# Patient Record
Sex: MALE | Race: WHITE
[De-identification: names, ages, dates, MRNs, and addresses within clinical notes are randomized per-mention and may not be internally consistent; named-entity substitution may affect disease eponyms.]

---

## 2018-05-22 ENCOUNTER — HOSPITAL ENCOUNTER (OUTPATIENT)
Dept: HOSPITAL 68 - STS | Age: 78
End: 2018-05-22
Attending: PODIATRIST
Payer: COMMERCIAL

## 2018-05-22 VITALS — WEIGHT: 196 LBS | HEIGHT: 70 IN | BODY MASS INDEX: 28.06 KG/M2

## 2018-05-22 DIAGNOSIS — Z85.038: ICD-10-CM

## 2018-05-22 DIAGNOSIS — Z86.718: ICD-10-CM

## 2018-05-22 DIAGNOSIS — L60.0: Primary | ICD-10-CM

## 2018-05-22 DIAGNOSIS — K21.9: ICD-10-CM

## 2018-05-22 NOTE — OPERATIVE REPORT
Operative/Inv Procedure Report
Surgery Date: 05/22/18
Name of Procedure:
1 local random advancement flap closure of open surgical wound right foot
2 local random advancement flap closure of open surgical wound left foot
3 partial matrixectomy right great toe
4 partial matrixectomy left great toe
5 intraoperative administration of ankle block anesthesia
Pre-Operative Diagnosis:
1 chronic, onychocryptosis right great toe
2 chronic, onychocryptosis left great toe
Post-Operative Diagnosis:
The same the same
Estimated Blood Loss: scant
Surgeon/Assistant:
Guadalupe DOLAN,Amol Melo DPM
Anesthesia: moderate sedation, block
 
Operative/Procedure Note
Note:
After obtaining informed consent the patient was brought to the operating room 
and placed on the operating table in the supine position.  The patient was then 
securely fastened to the operating table utilizing safety belt.  After 
administration of IV sedation, 10 mL of 0.5% Marcaine plain was infiltrated 
about the patient's left and right ankles.  2 g of Ancef were delivered 
intravenously times one dose.  A partial nail avulsion was performed at the 
medial aspect of the left and right great toes.  The left and right feet were 
then scrubbed, prepped and draped in usual aseptic manner.  A Penrose drain was 
placed about the right first metatarsophalangeal joint to achieve hemostasis.  A
Russell type procedure was utilized to resect the matrix cells medially.  The 
tissue was then sent for pathologic inspection.  Any remaining cells were 
curetted free.  A flap was then developed with release of the morning ligaments,
undermining mobilization and advancement of the adjacent tissues superiorly.  
The deep side of the flap was held with Vicryl and the skin edges reapproximated
4-0 nylon.  Incision was then dressed with Xeroform, 4 x 4's, Sofia and Coban.  
Attention was then directed to the left great toe where again a Penrose drain 
was placed about the first metatarsophalangeal joint to achieve hemostasis.  A 
Russell type procedure was then utilized to resect the matrix cells medially.  Any
remaining matrix cells were curetted free.  A flap was then developed with 
release of the morning ligaments, undermining and mobilization of the adjacent 
tissues superiorly.  The deep side of the flap was held with Vicryl and the skin
edges reapproximated 4-0 nylon.  Incision was dressed with Xeroform, 4 x 4's, 
Sofia and Coban.  The patient was noted to tolerate both procedure and 
anesthesia well and the patient was transported from the operating room to 
recovery vital signs stable best assess intact to both the left and right flaps.

## 2021-02-08 NOTE — HISTORY & PHYSICAL PRE-OP
General Information and HPI
History of Present Illness:
Adrianne is a 77-year-old male with a long-standing and worsening complaint of 
chronic ingrown nails to the medial aspect of his left and right great toes.  
The patient has undergone multiple, and office temporary partial nail avulsions,
some requiring courses of antibiotics.  Patient presents today for preoperative 
surgical consultation.
 
Allergies/Medications
Allergies:
Coded Allergies:
bacitracin (RASH 05/18/18)
 
Home Med list
Acetaminophen 500 MG TABLET   1 TAB PO PRN PAIN  (Reported)
Cholecalciferol (Vitamin D3) (Vitamin D) 1,000 UNIT TABLET   1 TAB PO DAILY 
SUPPLEMENT  (Reported)
Loratadine (Claritin) 10 MG TABLET   1 TAB PO PRN ALLERGIES  (Reported)
Multivit-Min/FA/Lycopen/Lutein (Centrum Silver Tablet) 1 EACH TABLET   1 TAB PO 
DAILY SUPPLEMENT  (Reported)
Omeprazole Magnesium (Prilosec Otc) 20 MG TABLET.DR   1 TAB PO DAILY GI  (
Reported)
Propylene Glycol (Systane Balance) 0.6 % DROPS   1 DROP OU TID BOTH EYES  (
Reported)
 
 
Past History
 
Medical History
Cardiovascular: DVT
Gastrointestinal: GERD
Cancer(s): COLON
 
Surgical History
Pertinent Surgical History: non-contributory
 
Review of Systems
Review of Systems:
Unremarkable except for that noted patient was no loss
 
Exam & Diagnostic Data
Physical Exam:
Lungs clear bilaterally.  Heart sounds rate and rhythm regular.  Lower extremity
physical exam demonstrates intact pedal pulses bilaterally.  Both dorsalis pedis
and posterior tibial arteries are palpable bilaterally.  Patient without any 
sensory motor deficits.  Deep tendon reflexes grossly intact.  Patient noted to 
have pain along the medial margin of the great toes left and right.
 
Assessment/Plan
Assessment/Plan:
Onychocryptosis left and right great toes medially.  A lengthy discussion 
reviewing both surgical and conservative options was held the patient at bedside
and the patient elected to go forward with surgery despite the risks.
 
As Ranked By This Provider
Problem List:
 1. Ingrowing nail
 
 
Attending MD Review Statement
 
Attending Statement
Attending MD Statement: examined this patient
no radiation